# Patient Record
Sex: FEMALE | Race: WHITE | Employment: OTHER | URBAN - METROPOLITAN AREA
[De-identification: names, ages, dates, MRNs, and addresses within clinical notes are randomized per-mention and may not be internally consistent; named-entity substitution may affect disease eponyms.]

---

## 2017-01-01 ENCOUNTER — OFFICE VISIT (OUTPATIENT)
Dept: CARDIOLOGY CLINIC | Age: 82
End: 2017-01-01

## 2017-01-01 VITALS
OXYGEN SATURATION: 98 % | DIASTOLIC BLOOD PRESSURE: 80 MMHG | BODY MASS INDEX: 21.71 KG/M2 | HEIGHT: 62 IN | WEIGHT: 118 LBS | SYSTOLIC BLOOD PRESSURE: 142 MMHG | HEART RATE: 97 BPM

## 2017-01-01 DIAGNOSIS — E78.5 DYSLIPIDEMIA: ICD-10-CM

## 2017-01-01 DIAGNOSIS — I10 ESSENTIAL HYPERTENSION: ICD-10-CM

## 2017-01-01 DIAGNOSIS — I35.0 AORTIC VALVE STENOSIS, ETIOLOGY OF CARDIAC VALVE DISEASE UNSPECIFIED: Primary | ICD-10-CM

## 2017-01-01 DIAGNOSIS — R01.1 HEART MURMUR: ICD-10-CM

## 2017-01-01 RX ORDER — AMLODIPINE BESYLATE 5 MG/1
5 TABLET ORAL DAILY
COMMUNITY

## 2017-10-26 PROBLEM — I35.0 AORTIC VALVE STENOSIS: Status: ACTIVE | Noted: 2017-01-01

## 2017-10-26 PROBLEM — I10 ESSENTIAL HYPERTENSION: Status: ACTIVE | Noted: 2017-01-01

## 2017-10-26 NOTE — PROGRESS NOTES
1. Have you been to the ER, urgent care clinic since your last visit? Hospitalized since your last visit? no  2. Have you seen or consulted any other health care providers outside of the [address] [place] [address] since your last visit? Include any pap smears or colon screening.  no

## 2017-10-26 NOTE — PROGRESS NOTES
HISTORY OF PRESENT ILLNESS  Lurlean Shone is a 80 y.o. female. Heart Murmur   Pertinent negatives include no chest pain, no abdominal pain, no headaches and no shortness of breath. Patient presents for a follow-up office visit. She has a past medical history significant for hypertension and dyslipidemia. She was initially referred here for evaluation of a heart murmur by her PCP. She underwent an echocardiogram through PCPs office in January 2016 which demonstrated a low normal left ventricular ejection fraction, EF 81%, normal diastolic parameters, normal wall thickness, and no reportable valvular heart disease. She then underwent a follow-up echocardiogram again through her PCPs office in March 2017 which showed a significant change in her aortic valve, now with moderate to severe aortic valve stenosis with a mean valve gradient of 38 mmHg. EF 65%. The patient was last seen in our office over a year ago. Since last visit, she states she has been feeling about the same. She denies any major change in her activity tolerance. She still stays very active doing her own housework and shopping. She has not noted any shortness of breath or chest pain with exertion. No dizziness or syncope. No orthopnea, PND or leg swelling. Past Medical History:   Diagnosis Date    Hypercholesteremia     Hypertension     Sigmoid diverticulum     Squamous cell carcinoma of anus (HCC)      Current Outpatient Prescriptions   Medication Sig Dispense Refill    amLODIPine (NORVASC) 5 mg tablet Take 5 mg by mouth daily.  hydroxyurea (HYDREA) 500 mg capsule Take 500 mg by mouth daily.  alendronate (FOSAMAX) 70 mg tablet Take 70 mg by mouth every Sunday.  levothyroxine (SYNTHROID) 25 mcg tablet Take 50 mcg by mouth Daily (before breakfast).  cholecalciferol, VITAMIN D3, (VITAMIN D3) 5,000 unit tab tablet Take  by mouth daily.  simvastatin (ZOCOR) 10 mg tablet Take  by mouth nightly.  MULTIVITAMIN PO Take  by mouth. Allergies   Allergen Reactions    Latex Unable to Obtain    Adhesive Unable to Obtain    Pcn [Penicillins] Unable to Obtain      Social History   Substance Use Topics    Smoking status: Former Smoker     Quit date: 2/6/1983    Smokeless tobacco: Never Used    Alcohol use Yes     Family History   Problem Relation Age of Onset    Colon Cancer Mother     Cancer Mother          Review of Systems   Constitutional: Negative for chills, fever and weight loss. HENT: Negative for nosebleeds. Eyes: Negative for blurred vision and double vision. Respiratory: Negative for cough, shortness of breath and wheezing. Cardiovascular: Negative for chest pain, palpitations, orthopnea, claudication, leg swelling and PND. Gastrointestinal: Negative for abdominal pain, heartburn, nausea and vomiting. Genitourinary: Negative for dysuria and hematuria. Musculoskeletal: Negative for falls and myalgias. Skin: Negative for rash. Neurological: Negative for dizziness, focal weakness and headaches. Endo/Heme/Allergies: Does not bruise/bleed easily. Psychiatric/Behavioral: Negative for substance abuse. Visit Vitals    /80    Pulse 97    Ht 5' 2\" (1.575 m)    Wt 53.5 kg (118 lb)    SpO2 98%    BMI 21.58 kg/m2       Physical Exam   Constitutional: She is oriented to person, place, and time. She appears well-developed and well-nourished. HENT:   Head: Normocephalic and atraumatic. Eyes: Conjunctivae are normal.   Neck: Neck supple. No JVD present. Carotid bruit is not present. Cardiovascular: Normal rate, regular rhythm, S1 normal, S2 normal and normal pulses. No extrasystoles are present. Exam reveals no gallop. Murmur heard. Harsh mid to late systolic murmur is present with a grade of 3/6  at the upper right sternal border radiating to the neck  Pulmonary/Chest: Effort normal and breath sounds normal. She has no wheezes. She has no rales. Abdominal: Soft. Bowel sounds are normal. There is no tenderness. Musculoskeletal: She exhibits no edema, tenderness or deformity. Neurological: She is alert and oriented to person, place, and time. Skin: Skin is warm and dry. No rash noted. No erythema. Psychiatric: She has a normal mood and affect. Her behavior is normal. Thought content normal.     EKG: Normal sinus rhythm, normal axis, right bundle branch block, nonspecific ST-T abnormality. Occasional atrial premature contractions. Compared to the previous EKG, the right bundle branch block is new. ASSESSMENT and PLAN    Aortic valve disease. This was moderate to severe on an echocardiogram done through her PCPs office in March 2017. She had a mean valve gradient of 38 mmHg. This was a significant change compared to an echocardiogram done the year prior at her PCPs office. I suspect she did have some aortic valve stenosis at that time which was underappreciated. She continues to be asymptomatic. I would prefer an echocardiogram done in our office so I can review the images myself. If in fact she did have aggressive worsening aortic valve stenosis, she will need serial echocardiograms every 6 months. Hypertension. Patient's blood pressure appears reasonably well-controlled on her current medical regimen, which I would continue. Dyslipidemia. Patient has been managed with simvastatin at a low dose. This is followed by her PCP. Historically she reports her lipids have been well controlled on this regimen. Right bundle branch block. This is a new finding on today's EKG. Patient is asymptomatic. Follow-up in 6 months, sooner if needed.

## 2017-10-26 NOTE — MR AVS SNAPSHOT
Visit Information Date & Time Provider Department Dept. Phone Encounter #  
 10/26/2017  4:00 PM Jennifer Villarreal MD [facility] [address] [id number] Upcoming Health Maintenance Date Due DTaP/Tdap/Td series (1 - Tdap) [birth year]/14/1954 ZOSTER VACCINE AGE 60> 6/14/1993 GLAUCOMA SCREENING Q2Y 8/14/1998 Pneumococcal 65+ High/Highest Risk (1 of 2 - PCV13) 8/14/1998 MEDICARE YEARLY EXAM 8/14/1998 INFLUENZA AGE 9 TO ADULT 8/1/2017 COLONOSCOPY 1/27/2018 Allergies as of 10/26/2017  Review Complete On: 10/26/2017 By: Karla Sample Severity Noted Reaction Type Reactions Latex    Unable to Obtain Adhesive    Unable to Obtain Pcn [Penicillins]    Unable to Obtain Current Immunizations  Never Reviewed No immunizations on file. Not reviewed this visit You Were Diagnosed With   
  
 Codes Comments Heart murmur    -  Primary ICD-10-CM: R01.1 ICD-9-CM: 950. 2 Aortic valve stenosis, etiology of cardiac valve disease unspecified     ICD-10-CM: I35.0 ICD-9-CM: 424.1 Vitals BP Pulse Height(growth percentile) Weight(growth percentile) SpO2 BMI  
 142/80 97 5' 2\" (1.575 m) 118 lb (53.5 kg) 98% 21.58 kg/m2 OB Status Smoking Status Postmenopausal Former Smoker Vitals History BMI and BSA Data Body Mass Index Body Surface Area  
 21.58 kg/m 2 1.53 m 2 Preferred Pharmacy Pharmacy Name Phone [facility] [address] [place] [facility] Your Updated Medication List  
  
   
This list is accurate as of: 10/26/17  4:25 PM.  Always use your most recent med list.  
  
  
  
  
 cholecalciferol (VITAMIN D3) 5,000 unit Tab tablet Commonly known as:  VITAMIN D3 Take  by mouth daily. FOSAMAX 70 mg tablet Generic drug:  alendronate Take 70 mg by mouth every Sunday. hydroxyurea 500 mg capsule Commonly known as:  HYDREA Take 500 mg by mouth daily. lisinopril 10 mg tablet Commonly known as:  Aundra Maci Take 10 mg by mouth daily. MULTIVITAMIN PO Take  by mouth. simvastatin 10 mg tablet Commonly known as:  ZOCOR Take  by mouth nightly. synthroid 25 mcg tablet Generic drug:  levothyroxine Take  by mouth Daily (before breakfast). We Performed the Following AMB POC EKG ROUTINE W/ 12 LEADS, INTER & REP [83150 CPT(R)] To-Do List   
 10/26/2017 ECHO:  2D ECHO COMPLETE ADULT (TTE) W OR WO CONTR Patient Instructions PLease follow up in 6 months We will call you to scheduled EChocardiogram 
 
 
  
Introducing hospitals & HEALTH SERVICES! Jayshree Thakkar introduces [employer] patient portal. Now you can access parts of your medical record, email your doctor's office, and request medication refills online. 1. In your internet browser, go to [email or web address] 2. Click on the First Time User? Click Here link in the Sign In box. You will see the New Member Sign Up page. 3. Enter your [employer] Access Code exactly as it appears below. You will not need to use this code after youve completed the sign-up process. If you do not sign up before the expiration date, you must request a new code. · [employer] Access Code: [email or web address] Expires: 1/24/2018  4:24 PM 
 
4. Enter the last four digits of your Social Security Number (xxxx) and Date of Birth (mm/dd/yyyy) as indicated and click Submit. You will be taken to the next sign-up page. 5. Create a [employer] ID. This will be your [employer] login ID and cannot be changed, so think of one that is secure and easy to remember. 6. Create a [employer] password. You can change your password at any time. 7. Enter your Password Reset Question and Answer. This can be used at a later time if you forget your password. 8. Enter your e-mail address.  You will receive e-mail notification when new information is available in [employer]. 9. Click Sign Up. You can now view and download portions of your medical record. 10. Click the Download Summary menu link to download a portable copy of your medical information. If you have questions, please visit the Frequently Asked Questions section of the [employer] website. Remember, [employer] is NOT to be used for urgent needs. For medical emergencies, dial 911. Now available from your iPhone and Android! Please provide this summary of care documentation to your next provider. Your primary care clinician is listed as Palmer Lima. If you have any questions after today's visit, please call [phone].